# Patient Record
Sex: FEMALE | Race: WHITE | NOT HISPANIC OR LATINO | URBAN - METROPOLITAN AREA
[De-identification: names, ages, dates, MRNs, and addresses within clinical notes are randomized per-mention and may not be internally consistent; named-entity substitution may affect disease eponyms.]

---

## 2018-04-16 ENCOUNTER — EMERGENCY (EMERGENCY)
Facility: HOSPITAL | Age: 23
LOS: 0 days | Discharge: HOME | End: 2018-04-16

## 2018-04-16 VITALS
RESPIRATION RATE: 18 BRPM | DIASTOLIC BLOOD PRESSURE: 67 MMHG | HEART RATE: 72 BPM | OXYGEN SATURATION: 99 % | SYSTOLIC BLOOD PRESSURE: 116 MMHG | TEMPERATURE: 99 F

## 2018-04-16 DIAGNOSIS — S16.1XXA STRAIN OF MUSCLE, FASCIA AND TENDON AT NECK LEVEL, INITIAL ENCOUNTER: ICD-10-CM
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
## 2018-04-16 DIAGNOSIS — R51 HEADACHE: ICD-10-CM
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
## 2018-04-16 RX ORDER — METHOCARBAMOL 500 MG/1
1000 TABLET, FILM COATED ORAL ONCE
Qty: 0 | Refills: 0 | Status: COMPLETED | OUTPATIENT
Start: 2018-04-16 | End: 2018-04-16

## 2018-04-16 RX ORDER — TIZANIDINE 4 MG/1
1 TABLET ORAL
Qty: 21 | Refills: 0 | OUTPATIENT
Start: 2018-04-16 | End: 2018-04-22

## 2018-04-16 RX ORDER — IBUPROFEN 200 MG
1 TABLET ORAL
Qty: 28 | Refills: 0 | OUTPATIENT
Start: 2018-04-16 | End: 2018-04-22

## 2018-04-16 RX ORDER — IBUPROFEN 200 MG
600 TABLET ORAL ONCE
Qty: 0 | Refills: 0 | Status: COMPLETED | OUTPATIENT
Start: 2018-04-16 | End: 2018-04-16

## 2018-04-16 RX ADMIN — Medication 600 MILLIGRAM(S): at 20:06

## 2018-04-16 RX ADMIN — METHOCARBAMOL 1000 MILLIGRAM(S): 500 TABLET, FILM COATED ORAL at 20:06
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
## 2020-08-06 PROBLEM — Z00.00 ENCOUNTER FOR PREVENTIVE HEALTH EXAMINATION: Status: ACTIVE | Noted: 2020-08-06

## 2020-08-11 ENCOUNTER — APPOINTMENT (OUTPATIENT)
Dept: OBGYN | Facility: CLINIC | Age: 25
End: 2020-08-11

## 2023-08-07 ENCOUNTER — EMERGENCY (EMERGENCY)
Facility: HOSPITAL | Age: 28
LOS: 0 days | Discharge: HOME | End: 2023-08-08
Attending: EMERGENCY MEDICINE
Payer: COMMERCIAL

## 2023-08-07 VITALS
OXYGEN SATURATION: 98 % | DIASTOLIC BLOOD PRESSURE: 73 MMHG | HEIGHT: 64 IN | TEMPERATURE: 98 F | RESPIRATION RATE: 16 BRPM | SYSTOLIC BLOOD PRESSURE: 133 MMHG | WEIGHT: 175.05 LBS | HEART RATE: 81 BPM

## 2023-08-07 DIAGNOSIS — R31.9 HEMATURIA, UNSPECIFIED: ICD-10-CM

## 2023-08-07 DIAGNOSIS — N76.0 ACUTE VAGINITIS: ICD-10-CM

## 2023-08-07 DIAGNOSIS — N39.0 URINARY TRACT INFECTION, SITE NOT SPECIFIED: ICD-10-CM

## 2023-08-07 DIAGNOSIS — L29.2 PRURITUS VULVAE: ICD-10-CM

## 2023-08-07 DIAGNOSIS — R30.0 DYSURIA: ICD-10-CM

## 2023-08-07 PROCEDURE — 87491 CHLMYD TRACH DNA AMP PROBE: CPT

## 2023-08-07 PROCEDURE — 81001 URINALYSIS AUTO W/SCOPE: CPT

## 2023-08-07 PROCEDURE — 86703 HIV-1/HIV-2 1 RESULT ANTBDY: CPT

## 2023-08-07 PROCEDURE — 87591 N.GONORRHOEAE DNA AMP PROB: CPT

## 2023-08-07 PROCEDURE — 36415 COLL VENOUS BLD VENIPUNCTURE: CPT

## 2023-08-07 PROCEDURE — 86780 TREPONEMA PALLIDUM: CPT

## 2023-08-07 PROCEDURE — 99284 EMERGENCY DEPT VISIT MOD MDM: CPT

## 2023-08-07 PROCEDURE — 87086 URINE CULTURE/COLONY COUNT: CPT

## 2023-08-07 RX ORDER — FLUCONAZOLE 150 MG/1
150 TABLET ORAL ONCE
Refills: 0 | Status: COMPLETED | OUTPATIENT
Start: 2023-08-07 | End: 2023-08-07

## 2023-08-08 VITALS
RESPIRATION RATE: 17 BRPM | DIASTOLIC BLOOD PRESSURE: 66 MMHG | OXYGEN SATURATION: 97 % | TEMPERATURE: 98 F | SYSTOLIC BLOOD PRESSURE: 120 MMHG | HEART RATE: 65 BPM

## 2023-08-08 LAB
APPEARANCE UR: ABNORMAL
BACTERIA # UR AUTO: ABNORMAL /HPF
BILIRUB UR-MCNC: NEGATIVE — SIGNIFICANT CHANGE UP
CAST: 0 /LPF — SIGNIFICANT CHANGE UP (ref 0–4)
COLOR SPEC: ABNORMAL
DIFF PNL FLD: ABNORMAL
GLUCOSE UR QL: NEGATIVE MG/DL — SIGNIFICANT CHANGE UP
HIV 1 & 2 AB SERPL IA.RAPID: SIGNIFICANT CHANGE UP
HYALINE CASTS # UR AUTO: 0 /LPF — SIGNIFICANT CHANGE UP (ref 0–4)
KETONES UR-MCNC: NEGATIVE MG/DL — SIGNIFICANT CHANGE UP
LEUKOCYTE ESTERASE UR-ACNC: ABNORMAL
NITRITE UR-MCNC: NEGATIVE — SIGNIFICANT CHANGE UP
PH UR: 6.5 — SIGNIFICANT CHANGE UP (ref 5–8)
PROT UR-MCNC: 30 MG/DL
RBC CASTS # UR COMP ASSIST: >1900 /HPF — HIGH (ref 0–4)
SP GR SPEC: 1.02 — SIGNIFICANT CHANGE UP (ref 1–1.03)
SPECIMEN SOURCE: SIGNIFICANT CHANGE UP
SQUAMOUS # UR AUTO: 6 /HPF — HIGH (ref 0–5)
T PALLIDUM AB TITR SER: NEGATIVE — SIGNIFICANT CHANGE UP
UROBILINOGEN FLD QL: 0.2 MG/DL — SIGNIFICANT CHANGE UP (ref 0.2–1)
WBC UR QL: 20 /HPF — HIGH (ref 0–5)

## 2023-08-08 RX ORDER — CEFPODOXIME PROXETIL 100 MG
100 TABLET ORAL ONCE
Refills: 0 | Status: COMPLETED | OUTPATIENT
Start: 2023-08-08 | End: 2023-08-08

## 2023-08-08 RX ORDER — CEFPODOXIME PROXETIL 100 MG
1 TABLET ORAL
Qty: 14 | Refills: 0
Start: 2023-08-08 | End: 2023-08-14

## 2023-08-08 RX ORDER — FLUCONAZOLE 150 MG/1
1 TABLET ORAL
Qty: 1 | Refills: 0
Start: 2023-08-08 | End: 2023-08-08

## 2023-08-08 RX ADMIN — Medication 100 MILLIGRAM(S): at 02:13

## 2023-08-08 RX ADMIN — FLUCONAZOLE 150 MILLIGRAM(S): 150 TABLET ORAL at 01:03

## 2023-08-08 NOTE — ED PROVIDER NOTE - OBJECTIVE STATEMENT
29 yo female with no pmh presents c/o dysuria/hematuria for 2 days. pt also notes white discharge and vaginal itching. pt denies any other symptoms including fevers, chill, headache, recent illness/travel, cough, abdominal pain, chest pain, or SOB.

## 2023-08-08 NOTE — ED PROVIDER NOTE - PHYSICAL EXAMINATION
Gen: NAD, AOx3  Head: NCAT  HEENT: PERRL, oral mucosa moist, normal conjunctiva, oropharynx clear without exudate or erythema  Lung: CTAB, no respiratory distress, no wheezing, rales, rhonchi  CV: normal s1/s2, rrr, Normal perfusion, pulses 2+ throughout  Abd: soft, NTND, no CVA tenderness  Genitourinary: no pelvic tenderness, no edema/erythema/lesion, white/yellow discharge, no CMT/adnexal tenderness (chaperone PCA [place])  MSK: No edema, no visible deformities, full range of motion in all 4 extremities  Neuro: CN II-XII grossly intact, No focal neurologic deficits  Skin: No rash   Psych: normal affect

## 2023-08-08 NOTE — ED PROVIDER NOTE - PATIENT PORTAL LINK FT
You can access the FollowMyHealth Patient Portal offered by [facility] by registering at the following website: [email or web address]. By joining [facility]’s FollowMyHealth portal, you will also be able to view your health information using other applications (apps) compatible with our system.

## 2023-08-08 NOTE — ED ADULT NURSE NOTE - NSFALLUNIVINTERV_ED_ALL_ED
Bed/Stretcher in lowest position, wheels locked, appropriate side rails in place/Call bell, personal items and telephone in reach/Instruct patient to call for assistance before getting out of bed/chair/stretcher/Non-slip footwear applied when patient is off stretcher/[place] to call system/Physically safe environment - no spills, clutter or unnecessary equipment/Purposeful proactive rounding/Room/bathroom lighting operational, light cord in reach

## 2023-08-08 NOTE — ED PROVIDER NOTE - NS ED ATTENDING STATEMENT MOD
This was a shared visit with the KRISTIN. I reviewed and verified the documentation and independently performed the documented:

## 2023-08-09 LAB
C TRACH RRNA SPEC QL NAA+PROBE: SIGNIFICANT CHANGE UP
CULTURE RESULTS: SIGNIFICANT CHANGE UP
N GONORRHOEA RRNA SPEC QL NAA+PROBE: SIGNIFICANT CHANGE UP
SPECIMEN SOURCE: SIGNIFICANT CHANGE UP

## 2023-10-06 ENCOUNTER — APPOINTMENT (OUTPATIENT)
Dept: UROLOGY | Facility: CLINIC | Age: 28
End: 2023-10-06

## 2023-10-10 ENCOUNTER — TRANSCRIPTION ENCOUNTER (OUTPATIENT)
Age: 28
End: 2023-10-10

## 2023-10-10 ENCOUNTER — APPOINTMENT (OUTPATIENT)
Dept: UROLOGY | Facility: CLINIC | Age: 28
End: 2023-10-10
Payer: COMMERCIAL

## 2023-10-10 VITALS
BODY MASS INDEX: 29.88 KG/M2 | WEIGHT: 175 LBS | DIASTOLIC BLOOD PRESSURE: 89 MMHG | HEART RATE: 80 BPM | HEIGHT: 64 IN | SYSTOLIC BLOOD PRESSURE: 130 MMHG | TEMPERATURE: 98 F

## 2023-10-10 DIAGNOSIS — Z78.9 OTHER SPECIFIED HEALTH STATUS: ICD-10-CM

## 2023-10-10 LAB
BILIRUB UR QL STRIP: NORMAL
COLLECTION METHOD: NORMAL
GLUCOSE UR-MCNC: NORMAL
HCG UR QL: 0.2 EU/DL
HGB UR QL STRIP.AUTO: NORMAL
KETONES UR-MCNC: NORMAL
LEUKOCYTE ESTERASE UR QL STRIP: NORMAL
NITRITE UR QL STRIP: NORMAL
PH UR STRIP: 7
PROT UR STRIP-MCNC: NORMAL
SP GR UR STRIP: 1.01

## 2023-10-10 PROCEDURE — 99204 OFFICE O/P NEW MOD 45 MIN: CPT

## 2024-01-24 ENCOUNTER — OUTPATIENT (OUTPATIENT)
Dept: OUTPATIENT SERVICES | Facility: HOSPITAL | Age: 29
LOS: 1 days | End: 2024-01-24
Payer: COMMERCIAL

## 2024-01-24 ENCOUNTER — RESULT REVIEW (OUTPATIENT)
Age: 29
End: 2024-01-24

## 2024-01-24 DIAGNOSIS — Z00.8 ENCOUNTER FOR OTHER GENERAL EXAMINATION: ICD-10-CM

## 2024-01-24 DIAGNOSIS — N20.0 CALCULUS OF KIDNEY: ICD-10-CM

## 2024-01-24 PROCEDURE — 76770 US EXAM ABDO BACK WALL COMP: CPT | Mod: 26

## 2024-01-24 PROCEDURE — 76770 US EXAM ABDO BACK WALL COMP: CPT

## 2024-01-25 DIAGNOSIS — N20.0 CALCULUS OF KIDNEY: ICD-10-CM

## 2024-02-22 DIAGNOSIS — N20.0 CALCULUS OF KIDNEY: ICD-10-CM

## 2024-02-22 DIAGNOSIS — N13.30 UNSPECIFIED HYDRONEPHROSIS: ICD-10-CM

## 2024-03-04 LAB
ANION GAP SERPL CALC-SCNC: 11 MMOL/L
BUN SERPL-MCNC: 12 MG/DL
CALCIUM SERPL-MCNC: 9.4 MG/DL
CHLORIDE SERPL-SCNC: 102 MMOL/L
CO2 SERPL-SCNC: 24 MMOL/L
CREAT SERPL-MCNC: 0.7 MG/DL
EGFR: 121 ML/MIN/1.73M2
GLUCOSE SERPL-MCNC: 92 MG/DL
POTASSIUM SERPL-SCNC: 4 MMOL/L
SODIUM SERPL-SCNC: 137 MMOL/L

## 2024-03-05 ENCOUNTER — NON-APPOINTMENT (OUTPATIENT)
Age: 29
End: 2024-03-05

## 2024-03-15 ENCOUNTER — APPOINTMENT (OUTPATIENT)
Dept: UROLOGY | Facility: CLINIC | Age: 29
End: 2024-03-15

## 2024-03-15 NOTE — HISTORY OF PRESENT ILLNESS
[FreeTextEntry1] : Ms. JERMAINE DUNBAR is a 28 year female w/ hx of nephrolithiasis p/w hx of dysuria and flank pain. Today she has no irritative voiding symptoms such as dysuria, frequency, or nocturia. No flank or suprapubic pain. Pt was seen in the ED on 8/2023 with sx of dysuria and ?hematuria and was treated for UTI with cefpodoxime BID for 1 week. On UA she had >100 RBCs/HPF and pyuria. UCx showed 10k urogenital amanda. She subsequently went to [place] and was found to have an obstructing 3mm ureteral stone for which she underwent a URS w LL and stent placement. Her stent has since been removed and today she presents for follow up/establishing care.  UA >1900 rbc/hpf (in setting of UTI and stone)  CT 9/2023 3mm UVJ stone w upstream hydronephrosis (This was prior to her  intervention with URS)  Office visit 3/15/24 Pt presents to review labs and imaging. On previously order RBUS she was found to have hydronephrosis in setting of left renal stone. Further CT imaging was ordered and plan discussed with patient over the phone as there was concern for a possible obstructing ureteral stone that could be missed by RBUS. Subsequent CT imaging showed resolution of hydro and a small left renal stone measuring 2mm. Labs also reviewed which showed pts renal function wnl.  The patient denies having any fevers, chills, nausea, vomiting, flank/abdominal pain or any irritative voiding symptoms.

## 2024-03-15 NOTE — ASSESSMENT
[FreeTextEntry1] : #nephrolithiasis s/p URS w LL on 9/2023 at an OSH for a 3mm distal stone which she was unable to pass. Her stent has since been removed. - UA? - BMP reviewed. Cr 0.7, eGFR 121 - RBUS reviewed and interpreted. Persistent mild hydronephrosis noted of each kidney, 3mm stone noted in the left renal collecting system. Bilateral ureter jets present. PVR 11cc.  - CT imaging reviewed and interpreted from [facility]. No evidence of hydronephrosis. Noted for a 0.2cm non obstructing left renal stone as previously seen on RBUS.  - made referral to nephrology for preventative management given her young age and strong family hx - pt has not yet seen a nephrologist - Discussed behavioral modifications as previously documented last visit - Can follow up in 6-12 months w repeat RBUS.   Patient was instructed to immediately go to their nearest ER should they experience severe unremitting flank/abdominal pain, fevers, chills, rigors, PO intolerance with nausea and vomiting. As this can be due to a persistently obstructing stone in setting of a urinary tract infection which can be a surgical emergency and requirement immediate decompression of the affecting collecting system.  Patient demonstrated verbal understanding and is in agreement with plan.

## 2024-05-24 ENCOUNTER — APPOINTMENT (OUTPATIENT)
Dept: UROLOGY | Facility: CLINIC | Age: 29
End: 2024-05-24

## 2024-08-05 ENCOUNTER — LABORATORY RESULT (OUTPATIENT)
Age: 29
End: 2024-08-05

## 2024-08-05 ENCOUNTER — APPOINTMENT (OUTPATIENT)
Dept: UROLOGY | Facility: CLINIC | Age: 29
End: 2024-08-05

## 2024-08-05 PROBLEM — R31.0 INTERMITTENT GROSS HEMATURIA: Status: ACTIVE | Noted: 2024-08-05

## 2024-08-05 PROCEDURE — 81003 URINALYSIS AUTO W/O SCOPE: CPT | Mod: QW

## 2024-08-05 PROCEDURE — G2211 COMPLEX E/M VISIT ADD ON: CPT | Mod: NC

## 2024-08-05 PROCEDURE — 99214 OFFICE O/P EST MOD 30 MIN: CPT

## 2024-08-05 NOTE — ASSESSMENT
[FreeTextEntry1] : 29F w hx of nephrolithiasis s/p URS w LL in [place] in 2023 for a small 3mm stone which was removed. Most recent CT on 3/2024 showed small 2mm left renal stone, non-obstructing. Pt is currently 20 weeks pregnant, this would be her first child, gender is male. - repeat UA/UCx - RBUS to assess for hydro and presence of ureteral jets -Patient advised to start her antibiotics which were previously prescribed by her OB/GYN physician.  We will also follow-up urine culture results from our office and if there are any bacterial resistances or changes we will call her with these results otherwise patient can follow-up after she delivers to reassess her options for stone intervention if indicated.  At this time I would not recommend pursuing any invasive urological procedure for stone removal as risks of anesthesia for patient and fetus do not outweigh the benefits of removing a small 2 mm non-obstructing renal stone which is asymptomatic.  Patient also has likelihood for spontaneous passage of a stone this size.   Observation with medical expulsive therapy (MET): This method is appropriate for some renal and small ureteral stones (<1 cm). For kidney stones under observation, patients will require repeat imaging every 6-12 months to assess the size/location of the stone and identify any new stones. For stones that have moved into the ureter, a trial of passage may be appropriate if there is no infection and pain is well controlled. If the patient experiences signs of infection (fever, chills), renal failure, uncontrollable pain, and/or refractory vomiting, they should come to the emergency room. Risks include UTIs, stone migration, increasing in size, and renal obstruction.

## 2024-08-05 NOTE — PLAN

## 2024-08-05 NOTE — HISTORY OF PRESENT ILLNESS
[FreeTextEntry1] : Ms. JERMAINE DUNBAR is a 28 year female w/ hx of nephrolithiasis p/w hx of dysuria and flank pain. Today she has no irritative voiding symptoms such as dysuria, frequency, or nocturia. No flank or suprapubic pain. Pt was seen in the ED on 8/2023 with sx of dysuria and ?hematuria and was treated for UTI with cefpodoxime BID for 1 week. On UA she had >100 RBCs/HPF and pyuria. UCx showed 10k urogenital amanda. She subsequently went to [place] and was found to have an obstructing 3mm ureteral stone for which she underwent a URS w LL and stent placement. Her stent has since been removed and today she presents for follow up/establishing care.  UA >1900 rbc/hpf (in setting of UTI and stone)  CT 9/2023 3mm UVJ stone w upstream hydronephrosis (This was prior to her  intervention with URS)  Office visit 8/5/24 Patient presents today with complaints of a one-time episode of gross hematuria about a week ago.  She denies having any associated irritative voiding symptoms such as dysuria frequency or nocturia.  She denies having any significant flank or suprapubic pain.  She has not passed any stones spontaneously in her urine to her knowledge.  She was recently seen by her OB/GYN physician who prescribed her metronidazole for a presumed UTI.

## 2024-08-10 ENCOUNTER — RESULT REVIEW (OUTPATIENT)
Age: 29
End: 2024-08-10

## 2024-08-10 ENCOUNTER — OUTPATIENT (OUTPATIENT)
Dept: OUTPATIENT SERVICES | Facility: HOSPITAL | Age: 29
LOS: 1 days | End: 2024-08-10
Payer: COMMERCIAL

## 2024-08-10 DIAGNOSIS — Z00.8 ENCOUNTER FOR OTHER GENERAL EXAMINATION: ICD-10-CM

## 2024-08-10 DIAGNOSIS — N20.0 CALCULUS OF KIDNEY: ICD-10-CM

## 2024-08-10 PROCEDURE — 76770 US EXAM ABDO BACK WALL COMP: CPT

## 2024-08-10 PROCEDURE — 76770 US EXAM ABDO BACK WALL COMP: CPT | Mod: 26

## 2024-08-11 DIAGNOSIS — N20.0 CALCULUS OF KIDNEY: ICD-10-CM

## 2024-08-19 ENCOUNTER — NON-APPOINTMENT (OUTPATIENT)
Age: 29
End: 2024-08-19

## 2024-10-11 ENCOUNTER — APPOINTMENT (OUTPATIENT)
Dept: UROLOGY | Facility: CLINIC | Age: 29
End: 2024-10-11

## 2025-02-10 ENCOUNTER — APPOINTMENT (OUTPATIENT)
Dept: UROLOGY | Facility: CLINIC | Age: 30
End: 2025-02-10